# Patient Record
Sex: FEMALE | Race: WHITE | NOT HISPANIC OR LATINO | URBAN - METROPOLITAN AREA
[De-identification: names, ages, dates, MRNs, and addresses within clinical notes are randomized per-mention and may not be internally consistent; named-entity substitution may affect disease eponyms.]

---

## 2018-04-16 ENCOUNTER — OFFICE VISIT (OUTPATIENT)
Dept: PAIN MEDICINE | Facility: CLINIC | Age: 53
End: 2018-04-16

## 2018-04-16 VITALS
SYSTOLIC BLOOD PRESSURE: 164 MMHG | TEMPERATURE: 98.1 F | HEART RATE: 76 BPM | RESPIRATION RATE: 16 BRPM | DIASTOLIC BLOOD PRESSURE: 96 MMHG | HEIGHT: 71 IN | OXYGEN SATURATION: 99 % | BODY MASS INDEX: 32.2 KG/M2 | WEIGHT: 230 LBS

## 2018-04-16 DIAGNOSIS — M47.812 CERVICAL SPONDYLOSIS WITHOUT MYELOPATHY: ICD-10-CM

## 2018-04-16 DIAGNOSIS — G89.4 CHRONIC PAIN SYNDROME: Primary | ICD-10-CM

## 2018-04-16 PROCEDURE — 80305 DRUG TEST PRSMV DIR OPT OBS: CPT | Performed by: ANESTHESIOLOGY

## 2018-04-16 PROCEDURE — 99204 OFFICE O/P NEW MOD 45 MIN: CPT | Performed by: ANESTHESIOLOGY

## 2018-04-16 RX ORDER — TRAMADOL HYDROCHLORIDE 50 MG/1
50 TABLET ORAL EVERY 8 HOURS PRN
COMMUNITY
End: 2022-07-11

## 2018-04-16 RX ORDER — ALPRAZOLAM 0.5 MG/1
0.5 TABLET ORAL DAILY
COMMUNITY

## 2018-04-16 RX ORDER — CYCLOBENZAPRINE HCL 10 MG
10 TABLET ORAL 3 TIMES DAILY PRN
COMMUNITY
End: 2022-07-11

## 2018-04-16 NOTE — PROGRESS NOTES
CHIEF COMPLAINT  Pt is referred here by Dr Ursula Vaughan for neck pain, with outset about 10 years ago,with gradual worsening since about 6 years ago.  Pt did have L arm pain beginning about 2013 with a series of 3 CESIs in 2013 at [facility] (by an attending neurologist)- Pt had no further arm pain following the CESIs,and neck pain has since then been noteably decreased.   Pt does have constant neck discomfort however.   Hx of P.T. About 4 years ago, pre and post CESIs, with minimal relief.  Pt currently having periodic dry needling (short term relief) and massages per chiropractor, with short term relief as well.       Subjective   Eliana Torres is a 53 y.o. female.   She presents to the office for evaluation of neck pain. She was referred here by Dr. Vaughan.     This 53-year-old woman was referred to us from Dr. Vaughan for chronic pain and cervical disc disorder  This started about 10 years ago.  This was worsening over the past 5-6 years.  This is a cervical epidural series in 2013 which were beneficial, decreasing neck pain and left arm pain as well.  Complains of aching neck pain bilaterally across the mid lower cervical area & even at the cervicothoracic junction and some pain that radiates down across the left suprascapular area.  Radiates through the left deltoid area but not consistently distal to that.   She does have pain is frequent and her pain can vary from mild pain to moderate pain.  Pain can affect her sleep.  Pain makes her feel depressed and irritable.  Her pain flares with flexion and extension and also turning the head bilaterally.  She did have some physical therapy which only provided her minimal relief a few years ago, but as previously noted the cervical epidurals were moderately relieve any and provided some long-term relief.  Massage and dry needling only short-term relief.      Medical comorbidities include a history of breast cancer back in 1992 with mastectomy in September  1992 and reconstruction surgery following.  She has a history of bilateral knee replacements in 2015 and also history of foot surgery in 2012.  Other medical comorbidities include a history of depressive and anxious symptomatology and also history of hypertension.  Bipolar disorder.  Insomnia.  Raynauds Disease.  Functional murmur.  GERD.  Obsessive-compulsive disorder.    She is  and has 1 child.  She works full time (desk job, she gets up each hour to stretch).  She does not drink any alcohol she does not smoke.  She does not engage in any risky behaviors.  She enjoys painting and also horseback riding and also engages daily and yoga and stretching exercises and does cardiovascular exercise at the gym 3 times a week.  No pertinent family health history.    The [place]an Depression Inventory is a positive score with a score of 18.    Incidentally she also complains of aching pain in the knees bilaterally that appear to be in the suprapatellar area.      I reviewed the office note from Dr. Vaughan from November 27, 2017.  Neck pain was worsening and she is not getting relief from dry needling.  Impact on mood is noted.      X-ray report of the right hand from July 2015 was normal.    MRI report of the cervical spine in January 4, 2018 is reviewed.  There is noted in the history that she has been having increased pain for 6 months and also left shoulder pain radiating down the left arm and tingling all the way into the fourth and fifth fingers.  This is a report from [facility] in [place].  The radiologist noted mild degenerative disc disease without any significant canal stenosis nor foraminal narrowing.  No cord abnormalities.    I reviewed the operative report which was a right breast reconstruction from March 2, 2018.  Dr. Bebo Carrillo.        History of Present Illness     PEG Assessment   What number best describes your pain on average in the past week?4  What number best describes how, during the  past week, pain has interfered with your enjoyment of life?7  What number best describes how, during the past week, pain has interfered with your general activity?  5        Current Outpatient Prescriptions:   •  ALPRAZolam (XANAX) 0.5 MG tablet, Take 0.5 mg by mouth Daily., Disp: , Rfl:   •  cyclobenzaprine (FLEXERIL) 10 MG tablet, Take 10 mg by mouth 3 (Three) Times a Day As Needed for Muscle Spasms., Disp: , Rfl:   •  losartan-hydrochlorothiazide (HYZAAR) 50-12.5 MG per tablet, Take 1 tablet by mouth daily, Disp: , Rfl:   •  traMADol (ULTRAM) 50 MG tablet, Take 50 mg by mouth Every 8 (Eight) Hours As Needed for Moderate Pain ., Disp: , Rfl:     The following portions of the patient's history were reviewed and updated as appropriate: allergies, current medications, past family history, past medical history, past social history, past surgical history and problem list.      REVIEW OF PERTINENT MEDICAL DATA    -------    Prelim UDS Immunoassay:    THC  (marijuana)  negative  JENNIFER (cocaine) negative  OPI (opiate) negative  AMP (amphet) negative  MET (methamph) negative  PCP (pcp)  negative  MDMA (ecstacy) negative  BAR (barbituate) negative  BZO (benzodiaz) negative  MTD (methadone) negative  TCA (tricyclic) negative  OXY (oxycodone) negative    GreenTemp warm  Appearance WNL    -------        Review of Systems   Constitutional: Positive for activity change (decreased). Negative for appetite change.   HENT: Negative for hearing loss and trouble swallowing.    Eyes: Negative for visual disturbance.   Respiratory: Negative for apnea, chest tightness and shortness of breath.    Cardiovascular: Negative for chest pain.   Gastrointestinal: Negative for constipation, diarrhea and nausea.   Musculoskeletal: Positive for arthralgias and neck pain.   Neurological: Positive for headaches. Negative for dizziness, weakness, light-headedness and numbness.   Psychiatric/Behavioral: Positive for sleep disturbance. Negative for  "suicidal ideas.         Vitals:    04/16/18 1455   BP: 164/96   Pulse: 76   Resp: 16   Temp: 98.1 °F (36.7 °C)   SpO2: 99%   Weight: 104 kg (230 lb)   Height: 180.3 cm (70.98\")   PainSc: 4  Comment: neck pain,L > R, ranges from 2-6/10   PainLoc: Neck         Objective   Physical Exam   Constitutional: She is oriented to person, place, and time. Vital signs are normal. She appears well-developed and well-nourished.  Non-toxic appearance. She does not have a sickly appearance. No distress.   HENT:   Head: Normocephalic and atraumatic.   Right Ear: Hearing and external ear normal.   Left Ear: Hearing and external ear normal.   Nose: Nose normal.   Mouth/Throat: Uvula is midline and oropharynx is clear and moist.   Eyes: Conjunctivae, EOM and lids are normal. Pupils are equal, round, and reactive to light.   Neck: Neck supple.   Cardiovascular: Normal rate, regular rhythm and normal heart sounds.    Pulmonary/Chest: Effort normal and breath sounds normal. No respiratory distress.   Abdominal: Soft. Normal appearance and bowel sounds are normal. There is no hepatosplenomegaly. There is no tenderness. There is no CVA tenderness.   Musculoskeletal:        Cervical back: She exhibits decreased range of motion (pain on extension past 5 deg.  Flexion is minimally painful at near-full.), tenderness, bony tenderness (left side facets much more tender than right.  Crepitus.  ), pain and spasm. She exhibits no swelling.        Lumbar back: She exhibits tenderness.   Lymphadenopathy:     She has no cervical adenopathy.   Neurological: She is alert and oriented to person, place, and time. She displays no tremor. No cranial nerve deficit. Coordination and gait normal.   Reflex Scores:       Tricep reflexes are 2+ on the right side and 2+ on the left side.       Bicep reflexes are 2+ on the right side and 2+ on the left side.       Brachioradialis reflexes are 2+ on the right side and 2+ on the left side.       Patellar reflexes are " "2+ on the right side and 2+ on the left side.  spurling increases pain on the left as a facet loading but is not clearly radicular.  Minimal pain on right spurling.   Skin: Skin is warm and dry.   Psychiatric: She has a normal mood and affect. Her behavior is normal. Judgment and thought content normal.   Nursing note and vitals reviewed.      Assessment/Plan   Elaina was seen today for neck pain.    Diagnoses and all orders for this visit:    Chronic pain syndrome  -     POC Urine Drug Screen, Triage  -     Urine Drug Screen Confirmation - Urine, Urine, Clean Catch; Future    Cervical spondylosis without myelopathy  -     Case Request  -     POC Urine Drug Screen, Triage  -     Urine Drug Screen Confirmation - Urine, Urine, Clean Catch; Future        --- Follow-up for procedure... Left (vs Bilat) CMBB, x2, 2 wks apart    -------  Education about Medial Branch Blockade and RF Therapy:    This medial branch blockade (MBB) suggested is intended for diagnostic purposes, with the intent of offering the patient Radiofrequency thermal rhizotomy (RF) if the MBB is diagnostically effective.  The diagnostic blockade is necessary to determine the likelihood that RF therapy could be efficacious in providing long term relief to the patient.    Medial branches are sensory nerve branches that connect to a facet joint and transmit sensations & pain signals from that joint.  Facet is a term for the type of joints found in the spine.  Medial branches are the nerves that go to a facet, and therefore are also sometimes called \"facet joint nerves\" (FJNs).      In a medial branch blockade procedure, xray fluoroscopy is used to verify the locations of the outside of the joint lines which are being targeted.  Under xray guidance, needles are placed to these areas.  Contrast dye is injected to confirm proper placement, with dye flowing over the joint area, and to ensure that the dye does not flow into unintended areas such as a vein.  " When this is confirmed, local anesthetic is injected to block the medial branch at that joint level.      If MBBs are diagnostically successful in blocking pain, then the patient is most likely a great candidate for Radiofrequency of those facet joint nerves.  In the RF procedure, needles are placed to the joint lines in the same fashion, and after testing, the needle tips are heated to thermally treat the nerves, blocking the nerves by in essence damaging the nerves with the heat treatment.       Medically, a successful RF procedure should provide a patient with 50% pain relief or more for at least 6 months.  Clinical experience suggests that successful patients receive relief more in the range of 12 months on average.  We also discussed that a fortunate minority of patients receive therapeutic success from the MBB, and may not require RF ablation.  If a patient receives more than 8 weeks of relief from MBB, then occasional repeat MBB for therapeutic purposes is a very reasonable alternative therapy.  This course of therapy is consistent with our LCDs according to our CMS  in the area, and therefore other insurance providers should follow accordingly.  We will monitor our patients to screen for these therapeutic responders and will offer RF therapy only when necessary.        We discussed that MBB & RF are not without risks.  Guidelines regarding anticoagulant use & neuraxial procedures will be respected.  Patients that are ill or otherwise may be at risk for sepsis will not have their spines accessed by neuraxial injections of any type.  This patient will not be offered these therapies if there is an increased risk.   We discussed that there is a risk of postprocedural pain and also a risk of worsening of clinical picture with these procedures as with any neuraxial procedure.    -------                     EMR Dragon/Transcription disclaimer:   Much of this encounter note is an electronic  transcription/translation of spoken language to printed text. The electronic translation of spoken language may permit erroneous, or at times, nonsensical words or phrases to be inadvertently transcribed; Although I have reviewed the note for such errors, some may still exist.

## 2018-04-17 ENCOUNTER — RESULTS ENCOUNTER (OUTPATIENT)
Dept: PAIN MEDICINE | Facility: CLINIC | Age: 53
End: 2018-04-17

## 2018-04-17 DIAGNOSIS — G89.4 CHRONIC PAIN SYNDROME: ICD-10-CM

## 2018-04-17 DIAGNOSIS — M47.812 CERVICAL SPONDYLOSIS WITHOUT MYELOPATHY: ICD-10-CM

## 2018-04-17 LAB
POC AMPHETAMINES: NEGATIVE
POC BARBITURATES: NEGATIVE
POC BENZODIAZEPHINES: NEGATIVE
POC COCAINE: NEGATIVE
POC METHADONE: NEGATIVE
POC METHAMPHETAMINE SCREEN URINE: NEGATIVE
POC OPIATES: NEGATIVE
POC OXYCODONE: NEGATIVE
POC PHENCYCLIDINE: NEGATIVE
POC PROPOXYPHENE: NEGATIVE
POC THC: NEGATIVE
POC TRICYCLIC ANTIDEPRESSANTS: NEGATIVE